# Patient Record
Sex: FEMALE | Race: WHITE | NOT HISPANIC OR LATINO | Employment: FULL TIME | URBAN - METROPOLITAN AREA
[De-identification: names, ages, dates, MRNs, and addresses within clinical notes are randomized per-mention and may not be internally consistent; named-entity substitution may affect disease eponyms.]

---

## 2017-09-05 ENCOUNTER — HOSPITAL ENCOUNTER (EMERGENCY)
Facility: MEDICAL CENTER | Age: 52
End: 2017-09-05
Payer: COMMERCIAL

## 2017-09-05 VITALS
HEART RATE: 69 BPM | OXYGEN SATURATION: 96 % | BODY MASS INDEX: 27.85 KG/M2 | SYSTOLIC BLOOD PRESSURE: 116 MMHG | HEIGHT: 69 IN | WEIGHT: 188.05 LBS | TEMPERATURE: 98 F | DIASTOLIC BLOOD PRESSURE: 59 MMHG | RESPIRATION RATE: 18 BRPM

## 2017-09-05 PROCEDURE — 302449 STATCHG TRIAGE ONLY (STATISTIC)

## 2017-09-05 ASSESSMENT — PAIN SCALES - GENERAL: PAINLEVEL_OUTOF10: 5

## 2017-09-06 NOTE — ED NOTES
"Chief Complaint   Patient presents with   • Foot Pain     stepped on nail, left foot     Pt ambulatory to triage with steady gait with family at bedside. Pt AOx4, denies CP/SOB. Pt here to receive tetanus vaccine secondary to stepping on a nail. Last tetanus shot unknown, greater than 5 years. Denies numbness/tingling, CMS/ROM intact. /59   Pulse 69   Temp 36.7 °C (98 °F)   Resp 18   Ht 1.753 m (5' 9\")   Wt 85.3 kg (188 lb 0.8 oz)   SpO2 96%   BMI 27.77 kg/m²   Pt informed and educated on triage process and wait times. Pt verbalizes understanding to inform either triage tech or RN to alert staff for any changes in condition. Pt placed in lobby.    "

## 2017-09-07 ENCOUNTER — OFFICE VISIT (OUTPATIENT)
Dept: URGENT CARE | Facility: PHYSICIAN GROUP | Age: 52
End: 2017-09-07
Payer: COMMERCIAL

## 2017-09-07 VITALS
SYSTOLIC BLOOD PRESSURE: 112 MMHG | OXYGEN SATURATION: 98 % | HEIGHT: 69 IN | TEMPERATURE: 98.6 F | RESPIRATION RATE: 16 BRPM | HEART RATE: 108 BPM | BODY MASS INDEX: 26.36 KG/M2 | DIASTOLIC BLOOD PRESSURE: 74 MMHG | WEIGHT: 178 LBS

## 2017-09-07 DIAGNOSIS — S91.332A PUNCTURE WOUND OF FOOT, LEFT, INITIAL ENCOUNTER: ICD-10-CM

## 2017-09-07 DIAGNOSIS — J01.00 ACUTE MAXILLARY SINUSITIS, RECURRENCE NOT SPECIFIED: ICD-10-CM

## 2017-09-07 DIAGNOSIS — J02.9 SORE THROAT: ICD-10-CM

## 2017-09-07 LAB
INT CON NEG: NORMAL
INT CON POS: NORMAL
S PYO AG THROAT QL: NORMAL

## 2017-09-07 PROCEDURE — 99204 OFFICE O/P NEW MOD 45 MIN: CPT | Mod: 25 | Performed by: NURSE PRACTITIONER

## 2017-09-07 PROCEDURE — 90471 IMMUNIZATION ADMIN: CPT | Performed by: NURSE PRACTITIONER

## 2017-09-07 PROCEDURE — 87880 STREP A ASSAY W/OPTIC: CPT | Performed by: NURSE PRACTITIONER

## 2017-09-07 PROCEDURE — 90715 TDAP VACCINE 7 YRS/> IM: CPT | Performed by: NURSE PRACTITIONER

## 2017-09-07 RX ORDER — AMOXICILLIN AND CLAVULANATE POTASSIUM 875; 125 MG/1; MG/1
1 TABLET, FILM COATED ORAL 2 TIMES DAILY
Qty: 14 TAB | Refills: 0 | Status: SHIPPED | OUTPATIENT
Start: 2017-09-07 | End: 2017-09-07

## 2017-09-07 RX ORDER — AMOXICILLIN AND CLAVULANATE POTASSIUM 875; 125 MG/1; MG/1
1 TABLET, FILM COATED ORAL 2 TIMES DAILY
Qty: 14 TAB | Refills: 0 | Status: SHIPPED | OUTPATIENT
Start: 2017-09-07

## 2017-09-07 ASSESSMENT — ENCOUNTER SYMPTOMS
SORE THROAT: 1
CONSTIPATION: 0
HEADACHES: 1
VOMITING: 0
FEVER: 0
NAUSEA: 0
ABDOMINAL PAIN: 0
DIARRHEA: 0
WEAKNESS: 0
DIZZINESS: 0
BLURRED VISION: 0
EYE REDNESS: 0
EYE PAIN: 0
SHORTNESS OF BREATH: 0
COUGH: 0
BACK PAIN: 0
CHILLS: 0
PHOTOPHOBIA: 0
DOUBLE VISION: 0
EYE DISCHARGE: 0
NECK PAIN: 0
MYALGIAS: 0

## 2017-09-07 NOTE — PROGRESS NOTES
"Subjective:      Janell FISHMAN is a 52 y.o. female who presents with Cough (headache, congestion and sore throat.  ) and Foot Problem (stepped on a nail, needs a tetanus shot)            HPI  Janell is a 52 year old female who is here for left foot injury. States stepped on a construction nail last night. States in flip flops and stepped on a construction board with nail sticking up. States \"new\" without rust. Localized tenderness. Denies swelling, redness or drainage.     C/o nasal congestion, dull headache, severe sore throat with chills last night. Has been taking care of her grandsons who have been \"sick\". Taken Theraflu. States she is a   \"carrier\" of strep.    PMH:  has no past medical history on file.  MEDS:   Current Outpatient Prescriptions:   •  Estradiol (VAGIFEM VA), Insert  in vagina., Disp: , Rfl:   •  fluticasone (FLONASE) 50 MCG/ACT nasal spray, Spray 1 Spray in nose every day. Each Nostril, Disp: 16 g, Rfl: 0  •  hydrocod polst-chlorphen polst (TUSSIONEX) 10-8 MG/5ML LQCR, Take 5 mL by mouth every 12 hours as needed for Cough or Rhinitis., Disp: 120 mL, Rfl: 0  •  azithromycin (ZITHROMAX) 250 MG TABS, 2 tabs by mouth day 1, 1 tab by mouth days 2-5, Disp: 6 Tab, Rfl: 0  •  hydrocodone-acetaminophen (VICODIN) 5-500 MG TABS, Take 1-2 Tabs by mouth every four hours as needed for 20 doses., Disp: 20 Each, Rfl: 0  ALLERGIES:   Allergies   Allergen Reactions   • Chlorobutanol-Oxytocin Anaphylaxis   • Mint Chocolate Chip Flavor Anaphylaxis     SURGHX:   Past Surgical History:   Procedure Laterality Date   • APPENDECTOMY     • HYSTERECTOMY LAPAROSCOPY     • PRIMARY C SECTION     • TONSILLECTOMY       SOCHX:  reports that she has never smoked. She has never used smokeless tobacco. She reports that she drinks alcohol. She reports that she does not use drugs.  FH: Family history was reviewed, no pertinent findings to report    Review of Systems   Constitutional: Negative for chills, fever and malaise/fatigue. " "  HENT: Positive for congestion and sore throat. Negative for ear pain.    Eyes: Negative for blurred vision, double vision, photophobia, pain, discharge and redness.   Respiratory: Negative for cough and shortness of breath.    Gastrointestinal: Negative for abdominal pain, constipation, diarrhea, nausea and vomiting.   Musculoskeletal: Negative for back pain, myalgias and neck pain.   Neurological: Positive for headaches. Negative for dizziness and weakness.   Endo/Heme/Allergies: Negative for environmental allergies.   All other systems reviewed and are negative.         Objective:     /74   Pulse (!) 108   Temp 37 °C (98.6 °F)   Resp 16   Ht 1.753 m (5' 9\")   Wt 80.7 kg (178 lb)   SpO2 98%   BMI 26.29 kg/m²      Physical Exam   Constitutional: She is oriented to person, place, and time. She appears well-developed and well-nourished. No distress.   HENT:   Head: Normocephalic.   Eyes: Conjunctivae and EOM are normal. Pupils are equal, round, and reactive to light.   Neck: Normal range of motion. Neck supple.   Cardiovascular: Normal rate and regular rhythm.    Pulmonary/Chest: Effort normal and breath sounds normal.   Musculoskeletal: Normal range of motion.        Left foot: There is tenderness. There is normal range of motion, no bony tenderness, no swelling, normal capillary refill, no crepitus and no deformity.        Feet:    Feet:   Left Foot:   Protective Sensation: 10 sites tested. 10 sites sensed.   Skin Integrity: Negative for ulcer, blister, skin breakdown, erythema, warmth, callus or dry skin.   Neurological: She is alert and oriented to person, place, and time.   Skin: Skin is warm and dry. Laceration noted. No abrasion, no bruising, no ecchymosis and no rash noted. She is not diaphoretic. No erythema.   Pinpoint puncture wound to ventral aspect of left foot at distal region of arch without redness, swelling or drainage, TTP at site.   Vitals reviewed.         Cleaned with dilute " hibiclens, applied TAB ointment, bandage to foot by provider     Assessment/Plan:     1. Sore throat    - POCT Rapid Strep A: NEG    2. Puncture wound of foot, left, initial encounter    - Tdap =>6yo IM    3. Acute maxillary sinusitis, recurrence not specified    - amoxicillin-clavulanate (AUGMENTIN) 875-125 MG Tab; Take 1 Tab by mouth 2 times a day for 7 days.  Dispense: 14 Tab; Refill: 0    May apply cool compress to area for swelling and discomfort  Keep area clean with mild soap and tepid water, pat dry  May apply TAB ointment with no weeping, drainage from site  May cover loosely with bandage to prevent dirt or debris into wounds  Monitor for skin infection with increased swelling, redness, drainage, pain- recheck    Increase water intake  Get rest  May use Ibuprofen/Tylenol prn for any fever, body aches or throat pain  May take longer acting antihistamine for seasonal allergy symptoms prn  May use saline nasal spray for nasal congestion  May use Flonase or Nasocort for allergen nasal congestion  May gargle with salt water prn for throat discomfort  May drink smoothies for nutrition if too painful to swallow solid foods  Monitor for productive cough, SOB and chest pain/tightness- need re-evaluation